# Patient Record
Sex: FEMALE | Race: WHITE | NOT HISPANIC OR LATINO | URBAN - METROPOLITAN AREA
[De-identification: names, ages, dates, MRNs, and addresses within clinical notes are randomized per-mention and may not be internally consistent; named-entity substitution may affect disease eponyms.]

---

## 2018-08-08 ENCOUNTER — OFFICE (OUTPATIENT)
Dept: URBAN - METROPOLITAN AREA CLINIC 75 | Facility: CLINIC | Age: 68
End: 2018-08-08

## 2018-08-08 VITALS
WEIGHT: 237 LBS | HEIGHT: 64 IN | SYSTOLIC BLOOD PRESSURE: 142 MMHG | DIASTOLIC BLOOD PRESSURE: 74 MMHG | HEART RATE: 76 BPM

## 2018-08-08 DIAGNOSIS — R12 HEARTBURN: ICD-10-CM

## 2018-08-08 DIAGNOSIS — R10.13 EPIGASTRIC PAIN: ICD-10-CM

## 2018-08-08 DIAGNOSIS — R19.7 DIARRHEA, UNSPECIFIED: ICD-10-CM

## 2018-08-08 DIAGNOSIS — R11.2 NAUSEA WITH VOMITING, UNSPECIFIED: ICD-10-CM

## 2018-08-08 DIAGNOSIS — K59.00 CONSTIPATION, UNSPECIFIED: ICD-10-CM

## 2018-08-08 DIAGNOSIS — R19.4 CHANGE IN BOWEL HABIT: ICD-10-CM

## 2018-08-08 PROCEDURE — 99204 OFFICE O/P NEW MOD 45 MIN: CPT | Performed by: INTERNAL MEDICINE

## 2018-08-08 RX ORDER — SUCRALFATE 1 G/1
4 TABLET ORAL
Qty: 120 | Refills: 6 | Status: ACTIVE
Start: 2018-08-08

## 2018-08-08 RX ORDER — ONDANSETRON HYDROCHLORIDE 4 MG/1
TABLET, ORALLY DISINTEGRATING ORAL
Qty: 40 | Refills: 3 | Status: COMPLETED
Start: 2018-08-08 | End: 2018-09-14

## 2018-09-12 VITALS
RESPIRATION RATE: 20 BRPM | OXYGEN SATURATION: 100 % | SYSTOLIC BLOOD PRESSURE: 154 MMHG | OXYGEN SATURATION: 98 % | SYSTOLIC BLOOD PRESSURE: 164 MMHG | DIASTOLIC BLOOD PRESSURE: 86 MMHG | DIASTOLIC BLOOD PRESSURE: 75 MMHG | WEIGHT: 235 LBS | SYSTOLIC BLOOD PRESSURE: 130 MMHG | TEMPERATURE: 100.3 F | OXYGEN SATURATION: 95 % | RESPIRATION RATE: 27 BRPM | RESPIRATION RATE: 18 BRPM | HEIGHT: 64 IN | SYSTOLIC BLOOD PRESSURE: 170 MMHG | SYSTOLIC BLOOD PRESSURE: 180 MMHG | SYSTOLIC BLOOD PRESSURE: 120 MMHG | DIASTOLIC BLOOD PRESSURE: 91 MMHG | RESPIRATION RATE: 4 BRPM | HEART RATE: 74 BPM | HEART RATE: 88 BPM | OXYGEN SATURATION: 92 % | DIASTOLIC BLOOD PRESSURE: 76 MMHG | SYSTOLIC BLOOD PRESSURE: 150 MMHG | HEART RATE: 69 BPM | RESPIRATION RATE: 16 BRPM | TEMPERATURE: 98.8 F | SYSTOLIC BLOOD PRESSURE: 133 MMHG | DIASTOLIC BLOOD PRESSURE: 62 MMHG | OXYGEN SATURATION: 96 % | HEART RATE: 79 BPM | HEART RATE: 68 BPM | HEART RATE: 75 BPM | RESPIRATION RATE: 29 BRPM | OXYGEN SATURATION: 94 % | RESPIRATION RATE: 28 BRPM | HEART RATE: 80 BPM | OXYGEN SATURATION: 90 % | DIASTOLIC BLOOD PRESSURE: 80 MMHG | HEART RATE: 72 BPM | HEART RATE: 70 BPM | SYSTOLIC BLOOD PRESSURE: 177 MMHG

## 2018-09-14 ENCOUNTER — AMBULATORY SURGICAL CENTER (OUTPATIENT)
Dept: URBAN - METROPOLITAN AREA SURGERY 17 | Facility: SURGERY | Age: 68
End: 2018-09-14

## 2018-09-14 DIAGNOSIS — R11.2 NAUSEA WITH VOMITING, UNSPECIFIED: ICD-10-CM

## 2018-09-14 DIAGNOSIS — R19.7 DIARRHEA, UNSPECIFIED: ICD-10-CM

## 2018-09-14 DIAGNOSIS — K21.0 GASTRO-ESOPHAGEAL REFLUX DISEASE WITH ESOPHAGITIS: ICD-10-CM

## 2018-09-14 DIAGNOSIS — R12 HEARTBURN: ICD-10-CM

## 2018-09-14 DIAGNOSIS — R19.4 CHANGE IN BOWEL HABIT: ICD-10-CM

## 2018-09-14 DIAGNOSIS — K59.00 CONSTIPATION, UNSPECIFIED: ICD-10-CM

## 2018-09-14 DIAGNOSIS — K29.70 GASTRITIS, UNSPECIFIED, WITHOUT BLEEDING: ICD-10-CM

## 2018-09-14 DIAGNOSIS — D12.2 BENIGN NEOPLASM OF ASCENDING COLON: ICD-10-CM

## 2018-09-14 DIAGNOSIS — K22.2 ESOPHAGEAL OBSTRUCTION: ICD-10-CM

## 2018-09-14 DIAGNOSIS — K57.30 DIVERTICULOSIS OF LARGE INTESTINE WITHOUT PERFORATION OR ABS: ICD-10-CM

## 2018-09-14 DIAGNOSIS — R10.13 EPIGASTRIC PAIN: ICD-10-CM

## 2018-09-14 DIAGNOSIS — D12.3 BENIGN NEOPLASM OF TRANSVERSE COLON: ICD-10-CM

## 2018-09-14 LAB
GI HISTOLOGY: A. SELECT: (no result)
GI HISTOLOGY: B. SELECT: (no result)
GI HISTOLOGY: C. UNSPECIFIED: (no result)
GI HISTOLOGY: D. UNSPECIFIED: (no result)
GI HISTOLOGY: E. SELECT: (no result)
GI HISTOLOGY: F. UNSPECIFIED: (no result)
GI HISTOLOGY: G. UNSPECIFIED: (no result)
GI HISTOLOGY: PDF REPORT: (no result)

## 2018-09-14 PROCEDURE — 45380 COLONOSCOPY AND BIOPSY: CPT | Performed by: INTERNAL MEDICINE

## 2018-09-14 PROCEDURE — 43239 EGD BIOPSY SINGLE/MULTIPLE: CPT | Performed by: INTERNAL MEDICINE

## 2018-09-18 ENCOUNTER — OFFICE (OUTPATIENT)
Dept: URBAN - METROPOLITAN AREA PATHOLOGY 4 | Facility: PATHOLOGY | Age: 68
End: 2018-09-18

## 2018-09-18 DIAGNOSIS — K21.0 GASTRO-ESOPHAGEAL REFLUX DISEASE WITH ESOPHAGITIS: ICD-10-CM

## 2018-09-18 DIAGNOSIS — K31.89 OTHER DISEASES OF STOMACH AND DUODENUM: ICD-10-CM

## 2018-09-18 DIAGNOSIS — D12.3 BENIGN NEOPLASM OF TRANSVERSE COLON: ICD-10-CM

## 2018-09-18 DIAGNOSIS — D12.2 BENIGN NEOPLASM OF ASCENDING COLON: ICD-10-CM

## 2018-09-18 PROCEDURE — 88305 TISSUE EXAM BY PATHOLOGIST: CPT | Performed by: INTERNAL MEDICINE

## 2023-03-23 ENCOUNTER — OFFICE VISIT (OUTPATIENT)
Dept: CARDIOLOGY | Facility: CLINIC | Age: 73
End: 2023-03-23
Payer: MEDICARE

## 2023-03-23 DIAGNOSIS — G47.33 OSA (OBSTRUCTIVE SLEEP APNEA): ICD-10-CM

## 2023-03-23 DIAGNOSIS — I10 ESSENTIAL HYPERTENSION: ICD-10-CM

## 2023-03-23 PROCEDURE — 3077F SYST BP >= 140 MM HG: CPT | Performed by: NURSE PRACTITIONER

## 2023-03-23 PROCEDURE — 99213 OFFICE O/P EST LOW 20 MIN: CPT | Performed by: NURSE PRACTITIONER

## 2023-03-23 PROCEDURE — 3079F DIAST BP 80-89 MM HG: CPT | Performed by: NURSE PRACTITIONER

## 2023-03-23 PROCEDURE — 93000 ELECTROCARDIOGRAM COMPLETE: CPT | Performed by: NURSE PRACTITIONER

## 2023-03-23 RX ORDER — FUROSEMIDE 40 MG/1
40 TABLET ORAL 2 TIMES DAILY
COMMUNITY

## 2023-03-23 RX ORDER — CARVEDILOL 25 MG/1
25 TABLET ORAL 2 TIMES DAILY WITH MEALS
COMMUNITY

## 2023-03-23 RX ORDER — ALPRAZOLAM 0.5 MG/1
TABLET ORAL
COMMUNITY
Start: 2023-03-08

## 2023-03-23 RX ORDER — POTASSIUM CHLORIDE 1500 MG/1
20 TABLET, FILM COATED, EXTENDED RELEASE ORAL DAILY
COMMUNITY
End: 2023-03-23

## 2023-03-23 RX ORDER — ESCITALOPRAM OXALATE 20 MG/1
TABLET ORAL
COMMUNITY
Start: 2023-03-01

## 2023-03-23 RX ORDER — LISINOPRIL 20 MG/1
TABLET ORAL
COMMUNITY
Start: 2023-03-01

## 2023-03-23 RX ORDER — SIMVASTATIN 40 MG
TABLET ORAL
COMMUNITY
Start: 2023-03-06

## 2023-03-23 NOTE — PROGRESS NOTES
[facility] Provider Note     Date:   2023   Name: Annel Mayo  :   1950  PCP: Lindsey Rogers MD    Chief Complaint   Patient presents with   • Sleep Apnea       Subjective     History of Present Illness  Annel Mayo is a 72 y.o. female who presents today for FREDDY.  She reports that she has a recalled BiPAP so she has not been using it.  She is exhausted and having excessive daytime sleepiness.  She does not use so clean.    Her initial blood pressure in clinic today is 220/130 nursing staff.  When I did a manual blood pressure myself I got 160/80.  Denies any symptoms of dizziness, headache, chest pain or jaw pain.  She reports her blood pressure is never this high at home.  I strongly encouraged her to call her PCP.  He is very anxious today and I feel this may be related to her lack of quality sleep since she is not using her PAP machine.  Anxiety and lack of sleep most definitely will affect blood pressure.    We see patient for sleep only but given her blood pressure I did an EKG in clinic today.  It was sinus rhythm with a nonspecific T wave abnormality.     Reports Denies   Chest Pain [] [x]   Shortness of Air [] [x]   Palpitations [] [x]   Edema [] [x]   Dizziness [] [x]   Syncope [] [x]     No Known Allergies    Current Outpatient Medications:   •  ALPRAZolam (XANAX) 0.5 MG tablet, , Disp: , Rfl:   •  carvedilol (COREG) 25 MG tablet, Take 1 tablet by mouth 2 (Two) Times a Day With Meals., Disp: , Rfl:   •  Diclofenac Sodium (VOLTAREN) 1 % gel gel, Apply 4 g topically to the appropriate area as directed 4 (Four) Times a Day., Disp: , Rfl:   •  escitalopram (LEXAPRO) 20 MG tablet, , Disp: , Rfl:   •  furosemide (LASIX) 40 MG tablet, Take 1 tablet by mouth 2 (Two) Times a Day., Disp: , Rfl:   •  lisinopril (PRINIVIL,ZESTRIL) 20 MG tablet, , Disp: , Rfl:   •  simvastatin (ZOCOR) 40 MG tablet, , Disp: , Rfl:     Past Medical History:   Diagnosis Date   •  "Asthma    • COPD (chronic obstructive pulmonary disease) ([facility])    • Essential (primary) hypertension    • FREDDY (obstructive sleep apnea)    • Other emphysema (HCC)    • Psychiatric illness    • Pure hypercholesterolemia       History reviewed. No pertinent surgical history.  Family History   Problem Relation Age of Onset   • Heart disease Mother    • Diabetes Mother    • Alcohol abuse Sister      Social History     Socioeconomic History   • Marital status:    • Number of children: 1   Tobacco Use   • Smoking status: Never   Substance and Sexual Activity   • Alcohol use: Not Currently   • Drug use: Not Currently       Objective     Vital Signs:  /80   Pulse 80   Ht 160 cm (63\")   Wt 117 kg (258 lb)   SpO2 97%   BMI 45.70 kg/m²   Estimated body mass index is 45.7 kg/m² as calculated from the following:    Height as of this encounter: 160 cm (63\").    Weight as of this encounter: 117 kg (258 lb).             Physical Exam  Vitals reviewed.   Eyes:      Pupils: Pupils are equal, round, and reactive to light.   Cardiovascular:      Rate and Rhythm: Normal rate and regular rhythm.      Heart sounds: Normal heart sounds.   Pulmonary:      Effort: Pulmonary effort is normal.   Skin:     General: Skin is warm and dry.   Neurological:      Mental Status: She is alert and oriented to person, place, and time.   Psychiatric:         Mood and Affect: Mood normal.                 ECG 12 Lead    Date/Time: 3/28/2023 9:41 PM  Performed by: Judith Sheldon APRN  Authorized by: Judith Sheldon APRN   Comparison: not compared with previous ECG   Previous ECG: no previous ECG available  Rhythm: sinus rhythm    Clinical impression: abnormal EKG and non-specific ECG             Assessment and Plan     Diagnoses and all orders for this visit:    1. FREDDY (obstructive sleep apnea)  Assessment & Plan:  Treating FREDDY will likely benefit patient's blood pressure      2. Essential hypertension  Assessment & " Plan:  Blood pressure elevated in clinic today.  EKG this rhythm with nonspecific T wave abnormality.  Encouraged patient to contact PCP.  Very anxious and teary-eyed in clinic.  As she calmed down her blood pressure improved slightly.        Recommendations: ER if symptoms increase, Sleep hygiene discussed, Sleep risks reviewed (driving, medical, sleep death, sedating agents), Limit salt, Stop cigarettes, Limit caffeine and Report if any new/changing symptoms immediately          Follow Up  Return in about 6 months (around 9/23/2023) for FREDDY.  Patient was given instructions and counseling regarding her condition or for health maintenance advice. Please see specific information pulled into the AVS if appropriate.

## 2023-03-28 VITALS
WEIGHT: 258 LBS | HEART RATE: 80 BPM | DIASTOLIC BLOOD PRESSURE: 80 MMHG | HEIGHT: 63 IN | BODY MASS INDEX: 45.71 KG/M2 | SYSTOLIC BLOOD PRESSURE: 160 MMHG | OXYGEN SATURATION: 97 %

## 2023-03-28 PROBLEM — I10 ESSENTIAL HYPERTENSION: Status: ACTIVE | Noted: 2023-03-28

## 2023-03-28 PROBLEM — G47.33 OSA (OBSTRUCTIVE SLEEP APNEA): Status: ACTIVE | Noted: 2023-03-28

## 2023-03-29 NOTE — ASSESSMENT & PLAN NOTE
Blood pressure elevated in clinic today.  EKG this rhythm with nonspecific T wave abnormality.  Encouraged patient to contact PCP.  Very anxious and teary-eyed in clinic.  As she calmed down her blood pressure improved slightly.

## 2023-09-28 ENCOUNTER — OFFICE VISIT (OUTPATIENT)
Dept: CARDIOLOGY | Facility: CLINIC | Age: 73
End: 2023-09-28
Payer: MEDICARE

## 2023-09-28 DIAGNOSIS — I10 ESSENTIAL HYPERTENSION: Primary | ICD-10-CM

## 2023-11-30 ENCOUNTER — OFFICE VISIT (OUTPATIENT)
Dept: CARDIOLOGY | Facility: CLINIC | Age: 73
End: 2023-11-30
Payer: MEDICARE

## 2023-11-30 VITALS
SYSTOLIC BLOOD PRESSURE: 140 MMHG | HEART RATE: 61 BPM | HEIGHT: 63 IN | WEIGHT: 264 LBS | DIASTOLIC BLOOD PRESSURE: 82 MMHG | OXYGEN SATURATION: 94 % | BODY MASS INDEX: 46.78 KG/M2

## 2023-11-30 DIAGNOSIS — G47.33 OSA (OBSTRUCTIVE SLEEP APNEA): ICD-10-CM

## 2023-11-30 DIAGNOSIS — I10 ESSENTIAL HYPERTENSION: ICD-10-CM

## 2023-11-30 PROBLEM — N28.9 IMPAIRED RENAL FUNCTION: Status: ACTIVE | Noted: 2021-10-18

## 2023-11-30 PROBLEM — E78.2 MIXED HYPERLIPIDEMIA: Status: ACTIVE | Noted: 2023-11-30

## 2023-11-30 PROBLEM — E66.01 MORBID OBESITY WITH BMI OF 40.0-44.9, ADULT: Status: ACTIVE | Noted: 2019-11-26

## 2023-11-30 PROBLEM — R06.02 SHORTNESS OF BREATH: Status: ACTIVE | Noted: 2019-11-26

## 2023-11-30 PROBLEM — J44.9 COPD (CHRONIC OBSTRUCTIVE PULMONARY DISEASE): Status: ACTIVE | Noted: 2023-11-30

## 2023-11-30 PROBLEM — F41.1 GAD (GENERALIZED ANXIETY DISORDER): Status: ACTIVE | Noted: 2023-10-17

## 2023-11-30 PROBLEM — J45.20 MILD INTERMITTENT ASTHMA WITHOUT COMPLICATION: Status: ACTIVE | Noted: 2021-05-24

## 2023-11-30 RX ORDER — BUDESONIDE AND FORMOTEROL FUMARATE DIHYDRATE 160; 4.5 UG/1; UG/1
2 AEROSOL RESPIRATORY (INHALATION)
COMMUNITY
Start: 2023-10-17

## 2023-11-30 RX ORDER — ALBUTEROL SULFATE 90 UG/1
2 AEROSOL, METERED RESPIRATORY (INHALATION) 4 TIMES DAILY
COMMUNITY
Start: 2023-10-17

## 2023-11-30 RX ORDER — FUROSEMIDE 20 MG/1
20 TABLET ORAL DAILY
COMMUNITY
Start: 2023-11-22

## 2023-11-30 NOTE — PROGRESS NOTES
[facility] Provider Note     Date:  2023   Name: Annel Mayo  :   1950  PCP: Lindsey Rogers MD    Chief Complaint   Patient presents with    Follow-up    Sleep Apnea       Subjective     History of Present Illness  Annel Mayo is a 73 y.o. female who presents today for FREDDY.  She reports that her BIPAP machine is comfortable, airflow is comfortable, and mask fits well.     She said she is very proud of herself for working so hard to use it consistently.  She will PAP compliance report dated 2023 to 2023 download interpreted in clinic today.  Shown patient uses her machine 100% of the time, over 4 hours 100% of the time, for an average use of 7 hours and 43 minutes.  Overall AHI is 2.3 showing excellent compliance and excellent control.  Patient is benefiting from PAP therapy.  Will plan to continue.    Blood pressure is much much much better in clinic today.     Reports Denies   Chest Pain [] [x]   Shortness of Air [] [x]   Palpitations [] [x]   Edema [] [x]   Dizziness [] [x]   Syncope [] [x]     No Known Allergies    Current Outpatient Medications:     albuterol sulfate  (90 Base) MCG/ACT inhaler, Inhale 2 puffs 4 (Four) Times a Day., Disp: , Rfl:     ALPRAZolam (XANAX) 0.5 MG tablet, , Disp: , Rfl:     budesonide-formoterol (SYMBICORT) 160-4.5 MCG/ACT inhaler, Inhale 2 puffs., Disp: , Rfl:     carvedilol (COREG) 25 MG tablet, Take 1 tablet by mouth 2 (Two) Times a Day With Meals., Disp: , Rfl:     escitalopram (LEXAPRO) 20 MG tablet, , Disp: , Rfl:     furosemide (LASIX) 20 MG tablet, Take 1 tablet by mouth Daily., Disp: , Rfl:     lisinopril (PRINIVIL,ZESTRIL) 20 MG tablet, , Disp: , Rfl:     simvastatin (ZOCOR) 40 MG tablet, , Disp: , Rfl:     Past Medical History:   Diagnosis Date    COPD (chronic obstructive pulmonary disease) 2023    FREDDY (obstructive sleep apnea)       History reviewed. No pertinent surgical  "history.  Family History   Problem Relation Age of Onset    Heart disease Mother     Diabetes Mother     Alcohol abuse Sister      Social History     Socioeconomic History    Marital status:     Number of children: 1   Tobacco Use    Smoking status: Never     Passive exposure: Never    Smokeless tobacco: Never   Vaping Use    Vaping Use: Never used   Substance and Sexual Activity    Alcohol use: Not Currently    Drug use: Not Currently    Sexual activity: Defer       Objective     Vital Signs:  /82 (BP Location: Left arm)   Pulse 61   Ht 160 cm (63\")   Wt 120 kg (264 lb)   SpO2 94%   BMI 46.77 kg/m²   Estimated body mass index is 46.77 kg/m² as calculated from the following:    Height as of this encounter: 160 cm (63\").    Weight as of this encounter: 120 kg (264 lb).       Physical Exam  Vitals reviewed.   Eyes:      Pupils: Pupils are equal, round, and reactive to light.   Cardiovascular:      Rate and Rhythm: Normal rate and regular rhythm.      Heart sounds: Normal heart sounds.   Pulmonary:      Effort: Pulmonary effort is normal.   Skin:     General: Skin is warm and dry.   Neurological:      Mental Status: She is alert and oriented to person, place, and time.   Psychiatric:         Mood and Affect: Mood normal.                     Assessment and Plan     Diagnoses and all orders for this visit:    1. FREDDY (obstructive sleep apnea)  Comments:  Benefiting from PAP therapy.  Plan to continue.  Orders:  -     PAP Therapy    2. Essential hypertension  Comments:  Much better in clinic today.  Treating FREDDY will likely benefit blood pressure          Recommendations: ER if symptoms increase, Sleep hygiene discussed, Sleep risks reviewed (driving, medical, sleep death, sedating agents), Limit salt, Stop cigarettes, Limit caffeine and Report if any new/changing symptoms immediately          Follow Up  Return in about 1 year (around 11/30/2024) for FREDDY.  Patient was given instructions and counseling " regarding her condition or for health maintenance advice. Please see specific information pulled into the AVS if appropriate.

## 2024-12-06 ENCOUNTER — OFFICE VISIT (OUTPATIENT)
Age: 74
End: 2024-12-06
Payer: MEDICARE

## 2024-12-06 VITALS
SYSTOLIC BLOOD PRESSURE: 146 MMHG | WEIGHT: 277 LBS | HEIGHT: 63 IN | HEART RATE: 69 BPM | BODY MASS INDEX: 49.08 KG/M2 | OXYGEN SATURATION: 95 % | DIASTOLIC BLOOD PRESSURE: 82 MMHG

## 2024-12-06 DIAGNOSIS — G47.33 OSA (OBSTRUCTIVE SLEEP APNEA): Primary | ICD-10-CM

## 2024-12-06 DIAGNOSIS — I10 ESSENTIAL HYPERTENSION: ICD-10-CM

## 2024-12-06 PROCEDURE — 3079F DIAST BP 80-89 MM HG: CPT | Performed by: NURSE PRACTITIONER

## 2024-12-06 PROCEDURE — 99213 OFFICE O/P EST LOW 20 MIN: CPT | Performed by: NURSE PRACTITIONER

## 2024-12-06 PROCEDURE — 3077F SYST BP >= 140 MM HG: CPT | Performed by: NURSE PRACTITIONER

## 2024-12-06 RX ORDER — METHOCARBAMOL 500 MG/1
500 TABLET, FILM COATED ORAL
COMMUNITY
Start: 2024-10-16

## 2024-12-06 RX ORDER — MELOXICAM 15 MG/1
15 TABLET ORAL DAILY
COMMUNITY
Start: 2024-10-16

## 2024-12-06 NOTE — ASSESSMENT & PLAN NOTE
Benefiting from PAP therapy.  Plan to continue.  Will be eligible for new machine at the end of January 2025.  Will make a reminder to order 1 in February 2025 and then patient will need a 31 to 90-day follow-up.

## 2024-12-06 NOTE — PROGRESS NOTES
[facility] Provider Note     Date:   2024   Name: Annel Mayo  :   1950  PCP: Lindsey Rogers MD         History of Present Illness    Annel Mayo is a 74 y.o. female who presents today for follow-up on known FREDDY.  She reports her machine and airflow are comfortable and working well.  In 2025 her machine will be 5 years old and she is requesting a new one.  She said her DME company, [employer], has changed her name to [employer].  Her mask is comfortable when its new but she said it is old and now leaking and she needs a replacement.  She also said she can never get her DME company to send her new filters.  I will send an order requesting all new supplies.    We can request a new machine in 2025.  If she gets 1 she will need a 31 to 90-day FREDDY follow-up.    Today she feels like her sleep issues are remaining the same.  She occasionally has daytime sleepiness.  She denies ever having had a car accident or near miss because of falling asleep driving.  She does not take naps.  She does not drink and she does not smoke.      She said she is very proud of herself for using her machine consistently and feels good with it.      FREDDY- Dr. [place] [facility] Robby              Problems Addressed this Visit       FREDDY (obstructive sleep apnea) - Primary     Benefiting from PAP therapy.  Plan to continue.  Will be eligible for new machine at the end of 2025.  Will make a reminder to order 1 in 2025 and then patient will need a 31 to 90-day follow-up.         Relevant Orders    PAP Therapy    Essential hypertension     Treating FREDDY will likely benefit blood pressure.          Diagnoses         Codes Comments    FREDDY (obstructive sleep apnea)    -  Primary ICD-10-CM: G47.33  ICD-9-CM: 327.23     Essential hypertension     ICD-10-CM: I10  ICD-9-CM: 401.9              Medications Discontinued During This Encounter   Medication Reason   • carvedilol  (COREG) 25 MG tablet Discontinued by another clinician       No Known Allergies      Current Outpatient Medications:   •  albuterol sulfate  (90 Base) MCG/ACT inhaler, Inhale 2 puffs 4 (Four) Times a Day., Disp: , Rfl:   •  ALPRAZolam (XANAX) 0.5 MG tablet, , Disp: , Rfl:   •  budesonide-formoterol (SYMBICORT) 160-4.5 MCG/ACT inhaler, Inhale 2 puffs., Disp: , Rfl:   •  escitalopram (LEXAPRO) 20 MG tablet, , Disp: , Rfl:   •  furosemide (LASIX) 20 MG tablet, Take 1 tablet by mouth Daily., Disp: , Rfl:   •  lisinopril (PRINIVIL,ZESTRIL) 20 MG tablet, , Disp: , Rfl:   •  meloxicam (MOBIC) 15 MG tablet, Take 1 tablet by mouth Daily., Disp: , Rfl:   •  methocarbamol (ROBAXIN) 500 MG tablet, Take 1 tablet by mouth., Disp: , Rfl:   •  simvastatin (ZOCOR) 40 MG tablet, , Disp: , Rfl:     Past Medical History:   Diagnosis Date   • COPD (chronic obstructive pulmonary disease) 11/30/2023   • FREDDY (obstructive sleep apnea)           Patient Active Problem List   Diagnosis   • FREDDY (obstructive sleep apnea)   • Essential hypertension   • RUFUS (generalized anxiety disorder)   • Impaired renal function   • Mild intermittent asthma without complication   • Mixed hyperlipidemia   • Morbid obesity with BMI of 40.0-44.9, adult   • Osteoarthritis of knee   • Shortness of breath   • COPD (chronic obstructive pulmonary disease)        Family History   Problem Relation Age of Onset   • Heart disease Mother    • Diabetes Mother    • Alcohol abuse Sister          family history includes Alcohol abuse in her sister; Diabetes in her mother; Heart disease in her mother.     Social History     Socioeconomic History   • Marital status:    • Number of children: 1   Tobacco Use   • Smoking status: Never     Passive exposure: Never   • Smokeless tobacco: Never   Vaping Use   • Vaping status: Never Used   Substance and Sexual Activity   • Alcohol use: Not Currently   • Drug use: Not Currently   • Sexual activity: Defer             Vital  "Signs:  /82 (BP Location: Left arm, Patient Position: Sitting, Cuff Size: Adult)   Pulse 69   Ht 160 cm (63\")   Wt 126 kg (277 lb)   SpO2 95%   BMI 49.07 kg/m²   Vitals:    12/06/24 1340   Patient Position: Sitting      Estimated body mass index is 49.07 kg/m² as calculated from the following:    Height as of this encounter: 160 cm (63\").    Weight as of this encounter: 126 kg (277 lb).     Physical Exam  Vitals reviewed.   Constitutional:       General: She is not in acute distress.  HENT:      Head: Normocephalic.   Eyes:      General: No scleral icterus.  Cardiovascular:      Rate and Rhythm: Normal rate.   Pulmonary:      Effort: Pulmonary effort is normal.   Musculoskeletal:         General: Normal range of motion.      Cervical back: Normal range of motion.   Skin:     General: Skin is warm.   Neurological:      Mental Status: She is alert and oriented to person, place, and time.   Psychiatric:         Mood and Affect: Mood normal.         Thought Content: Thought content normal.             Assessment and Plan     Diagnoses and all orders for this visit:    1. FREDDY (obstructive sleep apnea) (Primary)  Assessment & Plan:  Benefiting from PAP therapy.  Plan to continue.  Will be eligible for new machine at the end of January 2025.  Will make a reminder to order 1 in February 2025 and then patient will need a 31 to 90-day follow-up.    Orders:  -     PAP Therapy    2. Essential hypertension  Assessment & Plan:  Treating FREDDY will likely benefit blood pressure.            Recommendations: ER if symptoms increase and Report if any new/changing symptoms immediately    Follow Up  No follow-ups on file.    Judith Sheldon, NNAMDI   12/06/2024     Please note that this explicitly excludes time spent on other separate billable services such as performing procedures or test interpretation, when applicable.  This note was created using dictation software which occasionally transcribes nonsensical phrases. " Please contact the provider if any clarification is needed.

## 2025-02-04 ENCOUNTER — TELEPHONE (OUTPATIENT)
Age: 75
End: 2025-02-04
Payer: MEDICARE

## 2025-02-04 NOTE — TELEPHONE ENCOUNTER
Please call patient and ask her if she would like for me to order new PAP machine now that it is past January 20, 2025.  If so she will need to schedule a 31 to 90-day FREDDY follow-up.  Thanks    ----- Message from Judith Sheldon sent at 12/6/2024  2:25 PM EST -----  Regarding: PAP MAchine  Order a new PAP machine with [employer] in her near [place] after January 20, 2025 and then patient will need scheduled for 31 to 90-day FREDDY follow-up if she gets a new machine.

## 2025-02-05 DIAGNOSIS — G47.33 OSA (OBSTRUCTIVE SLEEP APNEA): Primary | ICD-10-CM

## 2025-02-05 NOTE — TELEPHONE ENCOUNTER
Yes she would like order be sent in, patient has not been using old one for last month or so she had breast surgery and its uncomfortable for her at the moment. But plans to be able to use it again this week.